# Patient Record
Sex: FEMALE | Race: OTHER | URBAN - METROPOLITAN AREA
[De-identification: names, ages, dates, MRNs, and addresses within clinical notes are randomized per-mention and may not be internally consistent; named-entity substitution may affect disease eponyms.]

---

## 2021-10-05 NOTE — PATIENT DISCUSSION
NO RT/RD, Retinal tear and detachment warning symptoms reviewed and patient instructed to call immediately if increasing floaters, flashes, or decreasing peripheral vision.

## 2021-11-22 ENCOUNTER — PREPPED CHART (OUTPATIENT)
Dept: URBAN - METROPOLITAN AREA CLINIC 17 | Facility: CLINIC | Age: 51
End: 2021-11-22

## 2022-04-26 NOTE — PATIENT DISCUSSION
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

## 2022-06-26 RX ORDER — EZETIMIBE 10 MG/1
TABLET ORAL
COMMUNITY

## 2022-06-26 RX ORDER — MONTELUKAST SODIUM 10 MG/1
TABLET ORAL
COMMUNITY

## 2022-06-26 RX ORDER — CELECOXIB 50 MG/1
CAPSULE ORAL
COMMUNITY

## 2022-06-26 RX ORDER — GABAPENTIN 100 MG/1
1 CAPSULE ORAL
COMMUNITY

## 2022-06-26 RX ORDER — AZELASTINE HCL 205.5 UG/1
SPRAY NASAL
COMMUNITY
Start: 2021-10-27

## 2022-06-26 RX ORDER — METFORMIN HYDROCHLORIDE 500 MG/1
TABLET, EXTENDED RELEASE ORAL
COMMUNITY
Start: 2021-11-10

## 2022-06-26 RX ORDER — ATORVASTATIN CALCIUM 40 MG/1
TABLET, FILM COATED ORAL
COMMUNITY

## 2022-06-26 RX ORDER — LEVOTHYROXINE SODIUM 0.07 MG/1
TABLET ORAL
COMMUNITY

## 2022-06-26 RX ORDER — INSULIN DEGLUDEC INJECTION 100 U/ML
INJECTION, SOLUTION SUBCUTANEOUS
COMMUNITY

## 2022-06-26 RX ORDER — PANTOPRAZOLE SODIUM 40 MG/1
TABLET, DELAYED RELEASE ORAL
COMMUNITY

## 2022-06-26 RX ORDER — FOLIC ACID 1 MG/1
TABLET ORAL
COMMUNITY

## 2022-06-26 RX ORDER — TRIAMTERENE AND HYDROCHLOROTHIAZIDE 37.5; 25 MG/1; MG/1
TABLET ORAL
COMMUNITY

## 2022-12-01 NOTE — PATIENT DISCUSSION
Due to Atrophy and Laser Scarring .  Explained recovery from sx takes 6 months to a year. Physical Discharge Summary Addendum:  Date: 10/15/2020  Total Number of Visits: 9  Referred by: GABRIELA Ortiz  Medical Diagnosis (from order):   Diagnosis Information      Diagnosis    719.41, 338.29 (ICD-9-CM) - M25.512, G89.29 (ICD-10-CM) - Chronic left shoulder pain    723.1, 338.29 (ICD-9-CM) - M54.2, G89.29 (ICD-10-CM) - Chronic neck pain                Patient discharged due to not scheduling more appointments.  Status of goals: based on last known status anticipate goals partially met

## 2023-03-21 NOTE — PATIENT DISCUSSION
Despite some risk factors, the patient does not demonstrate definitive evidence of glaucoma at this time. Topical Retinoid counseling:  Patient advised to apply a pea-sized amount only at bedtime and wait 30 minutes after washing their face before applying.  If too drying, patient may add a non-comedogenic moisturizer. The patient verbalized understanding of the proper use and possible adverse effects of retinoids.  All of the patient's questions and concerns were addressed.